# Patient Record
Sex: FEMALE | Race: WHITE
[De-identification: names, ages, dates, MRNs, and addresses within clinical notes are randomized per-mention and may not be internally consistent; named-entity substitution may affect disease eponyms.]

---

## 2017-03-13 ENCOUNTER — HOSPITAL ENCOUNTER (OUTPATIENT)
Dept: HOSPITAL 45 - C.ULTR | Age: 52
Discharge: HOME | End: 2017-03-13
Attending: UROLOGY
Payer: COMMERCIAL

## 2017-03-13 DIAGNOSIS — D49.519: Primary | ICD-10-CM

## 2017-03-13 NOTE — DIAGNOSTIC IMAGING REPORT
ULTRASOUND KIDNEYS AND BLADDER



CLINICAL HISTORY: Renal neoplasm.



COMPARISON STUDY: Abdominal CT dated 8/8/2016.



TECHNIQUE: Real-time, grayscale, and color flow sonography of the kidneys and

bladder is performed. Images are reviewed in the transverse and longitudinal

planes.



FINDINGS:



Kidneys: The kidneys are normal in size and echotexture. The right kidney

measures 9.0 x 4.9 x 5.5 cm and the left kidney measures 9.6 x 5.6 x 5.7 cm. 

There is no hydronephrosis. No shadowing renal calculi are identified. A 1.6 cm

indeterminant lesion is seen in the upper pole of the left kidney. No

perinephric fluid is identified.



Bladder: The bladder is normal in appearance. Bilateral ureteral jets were seen.





IMPRESSION:  



1. The kidneys are normal in size and without hydronephrosis.



2. The bladder is normal as visualized.



3. A 1.6 cm indeterminant lesion is again seen in the upper pole of left kidney.

When correlated with the 8/8/2016 CT findings renal cell carcinoma remains the

diagnosis of exclusion.







Electronically signed by:  Miller Ocampo M.D.

3/13/2017 10:15 AM



Dictated Date/Time:  3/13/2017 10:12 AM

## 2017-04-06 ENCOUNTER — HOSPITAL ENCOUNTER (OUTPATIENT)
Dept: HOSPITAL 45 - C.RADBBURG | Age: 52
Discharge: HOME | End: 2017-04-06
Attending: PHYSICIAN ASSISTANT
Payer: COMMERCIAL

## 2017-04-06 DIAGNOSIS — R06.02: ICD-10-CM

## 2017-04-06 DIAGNOSIS — R05: ICD-10-CM

## 2017-04-06 DIAGNOSIS — J45.909: Primary | ICD-10-CM

## 2017-04-06 NOTE — DIAGNOSTIC IMAGING REPORT
CHEST 2 VIEWS ROUTINE



CLINICAL HISTORY: ASTHMA (493), COUGH (786.2), SOB(786.05) fever



COMPARISON STUDY:  No previous studies for comparison.



FINDINGS: The bones soft tissues and hemidiaphragms are normal. The

cardiomediastinal silhouette is normal. The lungs are clear. The pulmonary

vasculature is normal. 



IMPRESSION:  Negative chest. 









Electronically signed by:  Joel Pacheco M.D.

4/6/2017 8:41 AM



Dictated Date/Time:  4/6/2017 8:40 AM

## 2018-03-29 ENCOUNTER — HOSPITAL ENCOUNTER (OUTPATIENT)
Dept: HOSPITAL 45 - C.ULTR | Age: 53
Discharge: HOME | End: 2018-03-29
Attending: UROLOGY
Payer: COMMERCIAL

## 2018-03-29 DIAGNOSIS — N28.1: Primary | ICD-10-CM

## 2018-03-29 NOTE — DIAGNOSTIC IMAGING REPORT
EXAMINATION: RENAL ULTRASOUND



CLINICAL HISTORY: N28.1 Renal cyst    



COMPARISON STUDY:  March 2017



FINDINGS: The right kidney measures 9.1 cm. The left kidney measures 9.7 cm. 

There is no evidence of hydronephrosis.  There is a stable 1 cm hypoechoic focus

within the mid to upper pole the left kidney. This demonstrates internal echoes

and is therefore indeterminate. It appears minimally smaller than on the

preceding study.



No bladder abnormalities are visualized.  Bilateral ureteral jets were

visualized.

                 

IMPRESSION : Slight interval decrease in the size of indeterminate 1 cm left

renal mass.







Electronically signed by:  Dustin Sanchez M.D.

3/29/2018 12:26 PM



Dictated Date/Time:  3/29/2018 12:24 PM